# Patient Record
Sex: FEMALE | Race: OTHER | NOT HISPANIC OR LATINO
[De-identification: names, ages, dates, MRNs, and addresses within clinical notes are randomized per-mention and may not be internally consistent; named-entity substitution may affect disease eponyms.]

---

## 2017-06-29 ENCOUNTER — APPOINTMENT (OUTPATIENT)
Dept: OPHTHALMOLOGY | Facility: CLINIC | Age: 72
End: 2017-06-29

## 2017-06-29 DIAGNOSIS — H35.371 PUCKERING OF MACULA, RIGHT EYE: ICD-10-CM

## 2017-06-29 DIAGNOSIS — H44.111: ICD-10-CM

## 2017-06-29 DIAGNOSIS — H30.891: ICD-10-CM

## 2017-06-29 DIAGNOSIS — H40.003 PREGLAUCOMA, UNSPECIFIED, BILATERAL: ICD-10-CM

## 2018-01-22 ENCOUNTER — APPOINTMENT (OUTPATIENT)
Dept: HEART AND VASCULAR | Facility: CLINIC | Age: 73
End: 2018-01-22
Payer: MEDICARE

## 2018-01-22 VITALS
RESPIRATION RATE: 13 BRPM | HEART RATE: 76 BPM | HEIGHT: 59 IN | WEIGHT: 136 LBS | BODY MASS INDEX: 27.42 KG/M2 | TEMPERATURE: 97.7 F | SYSTOLIC BLOOD PRESSURE: 125 MMHG | DIASTOLIC BLOOD PRESSURE: 80 MMHG | OXYGEN SATURATION: 96 %

## 2018-01-22 DIAGNOSIS — J40 BRONCHITIS, NOT SPECIFIED AS ACUTE OR CHRONIC: ICD-10-CM

## 2018-01-22 DIAGNOSIS — E78.5 HYPERLIPIDEMIA, UNSPECIFIED: ICD-10-CM

## 2018-01-22 DIAGNOSIS — Z78.9 OTHER SPECIFIED HEALTH STATUS: ICD-10-CM

## 2018-01-22 DIAGNOSIS — I65.29 OCCLUSION AND STENOSIS OF UNSPECIFIED CAROTID ARTERY: ICD-10-CM

## 2018-01-22 DIAGNOSIS — Z87.891 PERSONAL HISTORY OF NICOTINE DEPENDENCE: ICD-10-CM

## 2018-01-22 DIAGNOSIS — Z87.19 PERSONAL HISTORY OF OTHER DISEASES OF THE DIGESTIVE SYSTEM: ICD-10-CM

## 2018-01-22 DIAGNOSIS — R07.89 OTHER CHEST PAIN: ICD-10-CM

## 2018-01-22 DIAGNOSIS — Z82.49 FAMILY HISTORY OF ISCHEMIC HEART DISEASE AND OTHER DISEASES OF THE CIRCULATORY SYSTEM: ICD-10-CM

## 2018-01-22 DIAGNOSIS — E53.8 DEFICIENCY OF OTHER SPECIFIED B GROUP VITAMINS: ICD-10-CM

## 2018-01-22 PROCEDURE — 99203 OFFICE O/P NEW LOW 30 MIN: CPT

## 2018-01-22 RX ORDER — CYANOCOBALAMIN 1000 UG/ML
1000 INJECTION INTRAMUSCULAR; SUBCUTANEOUS
Qty: 3 | Refills: 0 | Status: ACTIVE | COMMUNITY
Start: 2017-08-02

## 2018-01-22 RX ORDER — ESTRADIOL 0.03 MG/D
0.03 PATCH TRANSDERMAL
Qty: 12 | Refills: 0 | Status: ACTIVE | COMMUNITY
Start: 2017-12-05

## 2018-01-22 RX ORDER — SUCRALFATE 1 G/1
1 TABLET ORAL
Qty: 180 | Refills: 0 | Status: ACTIVE | COMMUNITY
Start: 2017-12-18

## 2018-01-22 RX ORDER — ATORVASTATIN CALCIUM 20 MG/1
20 TABLET, FILM COATED ORAL DAILY
Qty: 15 | Refills: 0 | Status: ACTIVE | COMMUNITY
Start: 2018-01-22

## 2018-01-22 RX ORDER — FLUTICASONE PROPIONATE AND SALMETEROL 50; 100 UG/1; UG/1
100-50 POWDER RESPIRATORY (INHALATION)
Qty: 60 | Refills: 0 | Status: ACTIVE | COMMUNITY
Start: 2017-10-26

## 2018-01-22 RX ORDER — ALBUTEROL SULFATE 90 UG/1
108 (90 BASE) AEROSOL, METERED RESPIRATORY (INHALATION)
Qty: 8 | Refills: 0 | Status: ACTIVE | COMMUNITY
Start: 2017-10-26

## 2018-01-24 ENCOUNTER — APPOINTMENT (OUTPATIENT)
Dept: HEART AND VASCULAR | Facility: CLINIC | Age: 73
End: 2018-01-24
Payer: MEDICARE

## 2018-01-24 DIAGNOSIS — R06.09 OTHER FORMS OF DYSPNEA: ICD-10-CM

## 2018-01-24 DIAGNOSIS — I34.0 NONRHEUMATIC MITRAL (VALVE) INSUFFICIENCY: ICD-10-CM

## 2018-01-24 PROCEDURE — 93306 TTE W/DOPPLER COMPLETE: CPT | Mod: XE

## 2018-01-24 PROCEDURE — A9500: CPT

## 2018-01-24 PROCEDURE — 99213 OFFICE O/P EST LOW 20 MIN: CPT | Mod: 25

## 2018-01-24 PROCEDURE — 93015 CV STRESS TEST SUPVJ I&R: CPT

## 2018-01-24 PROCEDURE — 78452 HT MUSCLE IMAGE SPECT MULT: CPT

## 2018-03-13 ENCOUNTER — RX RENEWAL (OUTPATIENT)
Age: 73
End: 2018-03-13

## 2018-03-13 RX ORDER — ENALAPRIL MALEATE 2.5 MG/1
2.5 TABLET ORAL DAILY
Qty: 30 | Refills: 0 | Status: ACTIVE | COMMUNITY
Start: 2018-01-24 | End: 1900-01-01

## 2018-07-27 PROBLEM — Z78.9 ALCOHOL USE: Status: ACTIVE | Noted: 2018-01-22

## 2018-10-01 ENCOUNTER — RESULT REVIEW (OUTPATIENT)
Age: 73
End: 2018-10-01

## 2019-04-05 ENCOUNTER — OUTPATIENT (OUTPATIENT)
Dept: OUTPATIENT SERVICES | Facility: HOSPITAL | Age: 74
LOS: 1 days | Discharge: HOME | End: 2019-04-05
Payer: MEDICARE

## 2019-04-05 VITALS
DIASTOLIC BLOOD PRESSURE: 68 MMHG | SYSTOLIC BLOOD PRESSURE: 123 MMHG | HEART RATE: 94 BPM | OXYGEN SATURATION: 98 % | RESPIRATION RATE: 21 BRPM

## 2019-04-05 VITALS
HEART RATE: 85 BPM | OXYGEN SATURATION: 98 % | RESPIRATION RATE: 18 BRPM | HEIGHT: 58 IN | DIASTOLIC BLOOD PRESSURE: 83 MMHG | SYSTOLIC BLOOD PRESSURE: 155 MMHG | WEIGHT: 144.4 LBS | TEMPERATURE: 98 F

## 2019-04-05 DIAGNOSIS — H25.093 OTHER AGE-RELATED INCIPIENT CATARACT, BILATERAL: Chronic | ICD-10-CM

## 2019-04-05 DIAGNOSIS — Z90.710 ACQUIRED ABSENCE OF BOTH CERVIX AND UTERUS: Chronic | ICD-10-CM

## 2019-04-05 LAB
APPEARANCE UR: CLEAR — SIGNIFICANT CHANGE UP
BACTERIA # UR AUTO: PRESENT /HPF
BILIRUB UR-MCNC: NEGATIVE — SIGNIFICANT CHANGE UP
COLOR SPEC: YELLOW — SIGNIFICANT CHANGE UP
DIFF PNL FLD: ABNORMAL
EPI CELLS # UR: ABNORMAL /HPF (ref 0–5)
GLUCOSE UR QL: NEGATIVE — SIGNIFICANT CHANGE UP
KETONES UR-MCNC: NEGATIVE — SIGNIFICANT CHANGE UP
LEUKOCYTE ESTERASE UR-ACNC: ABNORMAL
NITRITE UR-MCNC: NEGATIVE — SIGNIFICANT CHANGE UP
PH UR: 6 — SIGNIFICANT CHANGE UP (ref 5–8)
PROT UR-MCNC: 100 MG/DL
RBC CASTS # UR COMP ASSIST: > 10 /HPF
SP GR SPEC: >=1.03 — SIGNIFICANT CHANGE UP (ref 1–1.03)
UROBILINOGEN FLD QL: 0.2 E.U./DL — SIGNIFICANT CHANGE UP
WBC UR QL: ABNORMAL /HPF

## 2019-04-05 PROCEDURE — 81001 URINALYSIS AUTO W/SCOPE: CPT

## 2019-04-05 PROCEDURE — 87086 URINE CULTURE/COLONY COUNT: CPT

## 2019-04-05 PROCEDURE — 29877 ARTHRS KNEE SURG DBRDMT/SHVG: CPT | Mod: LT

## 2019-04-05 RX ORDER — SODIUM CHLORIDE 9 MG/ML
1000 INJECTION, SOLUTION INTRAVENOUS
Qty: 0 | Refills: 0 | Status: DISCONTINUED | OUTPATIENT
Start: 2019-04-05 | End: 2019-04-05

## 2019-04-05 RX ORDER — MORPHINE SULFATE 50 MG/1
4 CAPSULE, EXTENDED RELEASE ORAL
Qty: 0 | Refills: 0 | Status: DISCONTINUED | OUTPATIENT
Start: 2019-04-05 | End: 2019-04-05

## 2019-04-05 RX ORDER — OXYCODONE AND ACETAMINOPHEN 5; 325 MG/1; MG/1
2 TABLET ORAL EVERY 4 HOURS
Qty: 0 | Refills: 0 | Status: DISCONTINUED | OUTPATIENT
Start: 2019-04-05 | End: 2019-04-05

## 2019-04-05 RX ORDER — OXYCODONE AND ACETAMINOPHEN 5; 325 MG/1; MG/1
1 TABLET ORAL EVERY 4 HOURS
Qty: 0 | Refills: 0 | Status: DISCONTINUED | OUTPATIENT
Start: 2019-04-05 | End: 2019-04-05

## 2019-04-06 LAB
CULTURE RESULTS: SIGNIFICANT CHANGE UP
SPECIMEN SOURCE: SIGNIFICANT CHANGE UP

## 2020-12-29 NOTE — ASU PREOP CHECKLIST - HAND OFF
Over the counter rewetting drops to help your eyes feel better:  These are okay to use 1-4 times a day on both eyes.  Remember that the more consistently you use these, the more effective they can be.  Avoid any product that has a redness reliever as they can be addictive and can make feelings of dryness worse.    *Systane Balance   *Systane Complete   *TheraTears Plus  *Refresh Relieva  *Genteal  *Blink Gel Tears    
yes

## 2022-07-19 PROBLEM — H40.003 GLAUCOMA SUSPECT OF BOTH EYES: Status: ACTIVE | Noted: 2017-06-29

## 2023-04-20 ENCOUNTER — OFFICE (OUTPATIENT)
Dept: URBAN - METROPOLITAN AREA CLINIC 92 | Facility: CLINIC | Age: 78
Setting detail: OPHTHALMOLOGY
End: 2023-04-20
Payer: MEDICARE

## 2023-04-20 DIAGNOSIS — H02.831: ICD-10-CM

## 2023-04-20 DIAGNOSIS — H16.223: ICD-10-CM

## 2023-04-20 DIAGNOSIS — H04.223: ICD-10-CM

## 2023-04-20 DIAGNOSIS — H02.832: ICD-10-CM

## 2023-04-20 DIAGNOSIS — H40.013: ICD-10-CM

## 2023-04-20 DIAGNOSIS — H02.835: ICD-10-CM

## 2023-04-20 DIAGNOSIS — H02.834: ICD-10-CM

## 2023-04-20 DIAGNOSIS — Z96.1: ICD-10-CM

## 2023-04-20 DIAGNOSIS — H26.493: ICD-10-CM

## 2023-04-20 PROCEDURE — 92014 COMPRE OPH EXAM EST PT 1/>: CPT | Performed by: SPECIALIST

## 2023-04-20 ASSESSMENT — CONFRONTATIONAL VISUAL FIELD TEST (CVF)
OS_FINDINGS: FULL
OD_FINDINGS: FULL

## 2023-04-20 ASSESSMENT — TEAR BREAK UP TIME (TBUT)
OD_TBUT: 3+
OS_TBUT: 3+

## 2023-04-20 ASSESSMENT — SUPERFICIAL PUNCTATE KERATITIS (SPK)
OS_SPK: T
OD_SPK: 1+

## 2023-04-20 ASSESSMENT — TONOMETRY
OS_IOP_MMHG: 14
OD_IOP_MMHG: 14

## 2023-04-21 ASSESSMENT — KERATOMETRY
OS_AXISANGLE_DEGREES: 161
OD_AXISANGLE_DEGREES: 089
METHOD_AUTO_MANUAL: AUTO
OS_K2POWER_DIOPTERS: 43.75
OS_K1POWER_DIOPTERS: 42.50
OD_K1POWER_DIOPTERS: 43.25
OD_K2POWER_DIOPTERS: 44.00

## 2023-04-21 ASSESSMENT — AXIALLENGTH_DERIVED
OD_AL: 23.306
OS_AL: 23.7305

## 2023-04-21 ASSESSMENT — VISUAL ACUITY
OD_BCVA: 20/25-1
OS_BCVA: 20/30-1

## 2023-04-21 ASSESSMENT — REFRACTION_AUTOREFRACTION
OS_AXIS: 122
OD_SPHERE: +0.50
OD_AXIS: 090
OD_CYLINDER: +0.25
OS_CYLINDER: +0.50
OS_SPHERE: -0.25

## 2023-04-21 ASSESSMENT — SPHEQUIV_DERIVED
OD_SPHEQUIV: 0.625
OS_SPHEQUIV: 0

## 2023-05-17 ENCOUNTER — OFFICE (OUTPATIENT)
Dept: URBAN - METROPOLITAN AREA CLINIC 92 | Facility: CLINIC | Age: 78
Setting detail: OPHTHALMOLOGY
End: 2023-05-17
Payer: MEDICARE

## 2023-05-17 DIAGNOSIS — H04.553: ICD-10-CM

## 2023-05-17 PROCEDURE — 68840 EXPLORE/IRRIGATE TEAR DUCTS: CPT | Performed by: OPHTHALMOLOGY

## 2023-05-17 PROCEDURE — 92012 INTRM OPH EXAM EST PATIENT: CPT | Performed by: OPHTHALMOLOGY

## 2023-05-17 ASSESSMENT — REFRACTION_AUTOREFRACTION
OS_AXIS: 122
OD_AXIS: 090
OS_CYLINDER: +0.50
OD_CYLINDER: +0.25
OS_SPHERE: -0.25
OD_SPHERE: +0.50

## 2023-05-17 ASSESSMENT — AXIALLENGTH_DERIVED
OS_AL: 23.7305
OD_AL: 23.306

## 2023-05-17 ASSESSMENT — KERATOMETRY
OD_K1POWER_DIOPTERS: 43.25
OD_K2POWER_DIOPTERS: 44.00
OS_K1POWER_DIOPTERS: 42.50
OS_K2POWER_DIOPTERS: 43.75
METHOD_AUTO_MANUAL: AUTO
OD_AXISANGLE_DEGREES: 089
OS_AXISANGLE_DEGREES: 161

## 2023-05-17 ASSESSMENT — SPHEQUIV_DERIVED
OD_SPHEQUIV: 0.625
OS_SPHEQUIV: 0

## 2023-05-17 ASSESSMENT — VISUAL ACUITY
OD_BCVA: 20/30
OS_BCVA: 20/40-2

## 2023-05-19 ENCOUNTER — OFFICE (OUTPATIENT)
Dept: URBAN - METROPOLITAN AREA CLINIC 28 | Facility: CLINIC | Age: 78
Setting detail: OPHTHALMOLOGY
End: 2023-05-19
Payer: MEDICARE

## 2023-05-19 ENCOUNTER — RX ONLY (RX ONLY)
Age: 78
End: 2023-05-19

## 2023-05-19 DIAGNOSIS — H26.491: ICD-10-CM

## 2023-05-19 PROBLEM — H04.553 NASOLACRIMAL DUCT OBSTRUCTION ACQUIRED; RIGHT EYE, LEFT EYE, BOTH EYES: Status: ACTIVE | Noted: 2023-05-17

## 2023-05-19 PROBLEM — H04.551 NASOLACRIMAL DUCT OBSTRUCTION ACQUIRED; RIGHT EYE, LEFT EYE, BOTH EYES: Status: ACTIVE | Noted: 2023-05-17

## 2023-05-19 PROBLEM — H04.552 NASOLACRIMAL DUCT OBSTRUCTION ACQUIRED; RIGHT EYE, LEFT EYE, BOTH EYES: Status: ACTIVE | Noted: 2023-05-17

## 2023-05-19 PROCEDURE — 66821 AFTER CATARACT LASER SURGERY: CPT | Performed by: SPECIALIST

## 2023-05-19 ASSESSMENT — KERATOMETRY
OD_K2POWER_DIOPTERS: 44.00
OD_AXISANGLE_DEGREES: 125
METHOD_AUTO_MANUAL: AUTO
OS_K1POWER_DIOPTERS: 43.75
OS_K2POWER_DIOPTERS: 44.00
OS_AXISANGLE_DEGREES: 099
OD_K1POWER_DIOPTERS: 43.00

## 2023-05-19 ASSESSMENT — SPHEQUIV_DERIVED
OS_SPHEQUIV: 0.25
OD_SPHEQUIV: 1.25

## 2023-05-19 ASSESSMENT — VISUAL ACUITY
OD_BCVA: 20/25-2
OS_BCVA: 20/60+2

## 2023-05-19 ASSESSMENT — REFRACTION_AUTOREFRACTION
OS_AXIS: 075
OD_AXIS: 050
OD_CYLINDER: -1.00
OS_SPHERE: +0.75
OD_SPHERE: +1.75
OS_CYLINDER: -1.00

## 2023-05-19 ASSESSMENT — AXIALLENGTH_DERIVED
OD_AL: 23.1147
OS_AL: 23.3591

## 2023-06-21 ENCOUNTER — OFFICE (OUTPATIENT)
Dept: URBAN - METROPOLITAN AREA CLINIC 92 | Facility: CLINIC | Age: 78
Setting detail: OPHTHALMOLOGY
End: 2023-06-21
Payer: MEDICARE

## 2023-06-21 DIAGNOSIS — H02.831: ICD-10-CM

## 2023-06-21 DIAGNOSIS — H02.832: ICD-10-CM

## 2023-06-21 DIAGNOSIS — H16.223: ICD-10-CM

## 2023-06-21 DIAGNOSIS — H04.553: ICD-10-CM

## 2023-06-21 DIAGNOSIS — H02.834: ICD-10-CM

## 2023-06-21 PROCEDURE — 68810 PROBE NASOLACRIMAL DUCT: CPT | Performed by: OPHTHALMOLOGY

## 2023-06-21 PROCEDURE — 92012 INTRM OPH EXAM EST PATIENT: CPT | Performed by: OPHTHALMOLOGY

## 2023-06-21 ASSESSMENT — KERATOMETRY
OS_AXISANGLE_DEGREES: 099
METHOD_AUTO_MANUAL: AUTO
OD_K1POWER_DIOPTERS: 43.00
OS_K1POWER_DIOPTERS: 43.75
OD_AXISANGLE_DEGREES: 125
OD_K2POWER_DIOPTERS: 44.00
OS_K2POWER_DIOPTERS: 44.00

## 2023-06-21 ASSESSMENT — SUPERFICIAL PUNCTATE KERATITIS (SPK)
OS_SPK: T
OD_SPK: 1+

## 2023-06-21 ASSESSMENT — REFRACTION_AUTOREFRACTION
OS_CYLINDER: -1.00
OD_SPHERE: +1.75
OS_SPHERE: +0.75
OS_AXIS: 075
OD_CYLINDER: -1.00
OD_AXIS: 050

## 2023-06-21 ASSESSMENT — TEAR BREAK UP TIME (TBUT)
OD_TBUT: 3+
OS_TBUT: 3+

## 2023-06-21 ASSESSMENT — CONFRONTATIONAL VISUAL FIELD TEST (CVF)
OD_FINDINGS: FULL
OS_FINDINGS: FULL

## 2023-06-21 ASSESSMENT — AXIALLENGTH_DERIVED
OD_AL: 23.1147
OS_AL: 23.3591

## 2023-06-21 ASSESSMENT — VISUAL ACUITY
OS_BCVA: 20/40-2
OD_BCVA: 20/30+2

## 2023-06-21 ASSESSMENT — SPHEQUIV_DERIVED
OD_SPHEQUIV: 1.25
OS_SPHEQUIV: 0.25

## 2023-06-30 ENCOUNTER — OFFICE (OUTPATIENT)
Dept: URBAN - METROPOLITAN AREA CLINIC 28 | Facility: CLINIC | Age: 78
Setting detail: OPHTHALMOLOGY
End: 2023-06-30
Payer: MEDICARE

## 2023-06-30 DIAGNOSIS — H16.223: ICD-10-CM

## 2023-06-30 DIAGNOSIS — H04.552: ICD-10-CM

## 2023-06-30 DIAGNOSIS — H04.551: ICD-10-CM

## 2023-06-30 DIAGNOSIS — H04.553: ICD-10-CM

## 2023-06-30 PROBLEM — H02.83 DERMATOCHALASIS; RIGHT UPPER LID, RIGHT LOWER LID, LEFT UPPER LID, LEFT LOWER LID: Status: ACTIVE | Noted: 2023-06-21

## 2023-06-30 PROCEDURE — 99024 POSTOP FOLLOW-UP VISIT: CPT | Performed by: SPECIALIST

## 2023-06-30 ASSESSMENT — SUPERFICIAL PUNCTATE KERATITIS (SPK)
OD_SPK: 1+
OS_SPK: T

## 2023-06-30 ASSESSMENT — TEAR BREAK UP TIME (TBUT)
OS_TBUT: 3+
OD_TBUT: 3+

## 2023-06-30 ASSESSMENT — REFRACTION_AUTOREFRACTION
OS_AXIS: 064
OS_SPHERE: +1.00
OS_CYLINDER: -1.00
OD_SPHERE: +1.00
OD_CYLINDER: -0.75
OD_AXIS: 047

## 2023-06-30 ASSESSMENT — VISUAL ACUITY
OD_BCVA: 20/25-2
OS_BCVA: 20/40-

## 2023-06-30 ASSESSMENT — CONFRONTATIONAL VISUAL FIELD TEST (CVF)
OD_FINDINGS: FULL
OS_FINDINGS: FULL

## 2023-06-30 ASSESSMENT — SPHEQUIV_DERIVED
OS_SPHEQUIV: 0.5
OD_SPHEQUIV: 0.625

## 2023-11-03 ENCOUNTER — OFFICE (OUTPATIENT)
Dept: URBAN - METROPOLITAN AREA CLINIC 28 | Facility: CLINIC | Age: 78
Setting detail: OPHTHALMOLOGY
End: 2023-11-03
Payer: MEDICARE

## 2023-11-03 DIAGNOSIS — H02.832: ICD-10-CM

## 2023-11-03 DIAGNOSIS — H40.013: ICD-10-CM

## 2023-11-03 DIAGNOSIS — Z96.1: ICD-10-CM

## 2023-11-03 DIAGNOSIS — H26.493: ICD-10-CM

## 2023-11-03 DIAGNOSIS — H02.835: ICD-10-CM

## 2023-11-03 DIAGNOSIS — H02.831: ICD-10-CM

## 2023-11-03 DIAGNOSIS — H16.223: ICD-10-CM

## 2023-11-03 DIAGNOSIS — H02.834: ICD-10-CM

## 2023-11-03 PROCEDURE — 92014 COMPRE OPH EXAM EST PT 1/>: CPT | Performed by: SPECIALIST

## 2023-11-03 ASSESSMENT — CONFRONTATIONAL VISUAL FIELD TEST (CVF)
OS_FINDINGS: FULL
OD_FINDINGS: FULL

## 2023-11-03 ASSESSMENT — REFRACTION_AUTOREFRACTION
OD_SPHERE: +1.25
OS_AXIS: 069
OS_CYLINDER: -1.00
OD_CYLINDER: -1.00
OS_SPHERE: +0.50
OD_AXIS: 064

## 2023-11-03 ASSESSMENT — SUPERFICIAL PUNCTATE KERATITIS (SPK)
OS_SPK: T
OD_SPK: 1+

## 2023-11-03 ASSESSMENT — SPHEQUIV_DERIVED
OD_SPHEQUIV: 0.75
OS_SPHEQUIV: 0

## 2023-11-03 ASSESSMENT — TEAR BREAK UP TIME (TBUT)
OS_TBUT: 3+
OD_TBUT: 3+

## 2024-03-19 PROBLEM — K44.9 DIAPHRAGMATIC HERNIA WITHOUT OBSTRUCTION OR GANGRENE: Chronic | Status: ACTIVE | Noted: 2019-04-04

## 2024-03-19 PROBLEM — K29.90 GASTRODUODENITIS, UNSPECIFIED, WITHOUT BLEEDING: Chronic | Status: ACTIVE | Noted: 2019-04-04

## 2024-03-19 PROBLEM — K21.9 GASTRO-ESOPHAGEAL REFLUX DISEASE WITHOUT ESOPHAGITIS: Chronic | Status: ACTIVE | Noted: 2019-04-04

## 2024-03-19 PROBLEM — J40 BRONCHITIS, NOT SPECIFIED AS ACUTE OR CHRONIC: Chronic | Status: ACTIVE | Noted: 2019-04-05

## 2024-03-19 PROBLEM — K63.5 POLYP OF COLON: Chronic | Status: ACTIVE | Noted: 2019-04-04

## 2024-03-19 PROBLEM — K57.90 DIVERTICULOSIS OF INTESTINE, PART UNSPECIFIED, WITHOUT PERFORATION OR ABSCESS WITHOUT BLEEDING: Chronic | Status: ACTIVE | Noted: 2019-04-04

## 2024-04-04 ENCOUNTER — NON-APPOINTMENT (OUTPATIENT)
Age: 79
End: 2024-04-04

## 2024-04-04 ENCOUNTER — APPOINTMENT (OUTPATIENT)
Dept: COLORECTAL SURGERY | Facility: CLINIC | Age: 79
End: 2024-04-04
Payer: MEDICARE

## 2024-04-04 VITALS
DIASTOLIC BLOOD PRESSURE: 79 MMHG | HEART RATE: 98 BPM | WEIGHT: 150 LBS | HEIGHT: 59 IN | TEMPERATURE: 96.8 F | BODY MASS INDEX: 30.24 KG/M2 | SYSTOLIC BLOOD PRESSURE: 138 MMHG

## 2024-04-04 DIAGNOSIS — K64.9 UNSPECIFIED HEMORRHOIDS: ICD-10-CM

## 2024-04-04 PROCEDURE — 99203 OFFICE O/P NEW LOW 30 MIN: CPT | Mod: 25

## 2024-04-04 PROCEDURE — 46600 DIAGNOSTIC ANOSCOPY SPX: CPT

## 2024-04-04 RX ORDER — HYDROCORTISONE 25 MG/G
2.5 CREAM TOPICAL
Qty: 1 | Refills: 3 | Status: ACTIVE | COMMUNITY
Start: 2024-04-04 | End: 1900-01-01

## 2024-04-04 RX ORDER — DULOXETINE HYDROCHLORIDE 60 MG/1
60 CAPSULE, DELAYED RELEASE PELLETS ORAL
Refills: 0 | Status: ACTIVE | COMMUNITY

## 2024-04-04 RX ORDER — ALPRAZOLAM 0.5 MG/1
0.5 TABLET ORAL
Refills: 0 | Status: ACTIVE | COMMUNITY

## 2024-04-04 NOTE — ASSESSMENT
[FreeTextEntry1] : Exam findings and diagnosis were discussed at length with patient.  Management options discussed. Recommend supportive care.  Witch hazel and hydrocortisone as needed for symptoms were discussed. All questions were answered, patient expressed understanding, and is agreeable to this plan.

## 2024-04-04 NOTE — PHYSICAL EXAM
[FreeTextEntry1] : Medical assistant present for duration of physical examination   General no acute distress, alert and oriented Psych calm, pleasant demeanor, responding appropriately to questions Nonlabored breathing Ambulating without assistance Skin normal color and pigment, no visible lesions or rashes    Anorectal Exam: Inspection no erythema, induration or fluctuance, no skin excoriation, no fissure, small residual right anterior external hemorrhoid without acute inflammation or thrombosis TORRIE nontender, no masses palpated, no blood on gloved finger   Procedure: Anoscopy   Pre procedure Diagnosis: hemorrhoid Post procedure Diagnosis: hemorrhoid Anesthesia: none Estimated blood loss: none Specimen: none Complications: none   Consent obtained. Anoscopy was performed by passing a lighted anoscope with lubricant jelly into the anal canal and the entire anal mucosal surface was inspected. Findings included no fissure, mild internal hemorrhoids wihtout acute inflammation, no visible masses or lesions in anal canal   Patient tolerated examination and procedure well.

## 2024-04-04 NOTE — HISTORY OF PRESENT ILLNESS
[FreeTextEntry1] : 80 yo F presents for initial evaluation of hemorrhoids, referred by OBGYN Dr. Milagros Garcia  Denies PMH or PSH, per chart review, seen by Cardiologist 2018, h/o HLD, coronary artery plaque, MV insufficiency, R choroiditis and panuveitis syndrome, h/o gastric ulcer. Pt denies glaucoma, reports cataracts h/o hysterectomy () 2/2 dysplasia of uterus (denies malignancy, cervix in place), cataract surgery, ingrown toe nail treatment  Former cigarette use x 40 years, quit ~  Medication: duloxetine 60 mg, Xanax .05mg, atorvastatin  All: None Mother  from colon CA age 61  Last colonoscopy ~ with Dr. Daryl Tirado which was normal per pt. Personal h/o polyps. 10/1/2018 EGD- pathology in chart showed chronic inflammation of esophagus, reactive gastropathy, neg H. Pylori, last EGD in  per pt  Reports h/o hemorrhoids after delivery of child 58 years ago. Managed w/ OTC suppository in past years, but didn't feel like it helped.  Denies worsening of symptoms over the years, but finally interested in treatment. GI referred her to [facility] whom she saw ~ 2 years ago, advised dietary changes, no further treatment given.  Hemorrhoids are external and associated w/ sudden discomfort and itching with sitting. Admits to seeing scant BRB on panty liner this morning, otherwise denies h/o bleeding Denies fecal smearing or difficulty cleaning after defecation Denies using topical creams, witch hazel or sitz baths Moves bowels twice daily, cleans self w/ TP and wet wipes and admits to being meticulous .  Pt denies issues with stooling. Denies constipation, straining or diarrhea Does not take fiber supplement or stool softener Reports daily intake dietary fiber. Drinks several glasses water daily Denies ASA use

## 2024-05-03 ENCOUNTER — OFFICE (OUTPATIENT)
Dept: URBAN - METROPOLITAN AREA CLINIC 28 | Facility: CLINIC | Age: 79
Setting detail: OPHTHALMOLOGY
End: 2024-05-03
Payer: MEDICARE

## 2024-05-03 DIAGNOSIS — H26.493: ICD-10-CM

## 2024-05-03 DIAGNOSIS — H02.832: ICD-10-CM

## 2024-05-03 DIAGNOSIS — H02.834: ICD-10-CM

## 2024-05-03 DIAGNOSIS — H16.223: ICD-10-CM

## 2024-05-03 DIAGNOSIS — Z96.1: ICD-10-CM

## 2024-05-03 DIAGNOSIS — H02.835: ICD-10-CM

## 2024-05-03 DIAGNOSIS — H02.831: ICD-10-CM

## 2024-05-03 DIAGNOSIS — H40.013: ICD-10-CM

## 2024-05-03 PROCEDURE — 92133 CPTRZD OPH DX IMG PST SGM ON: CPT | Performed by: SPECIALIST

## 2024-05-03 PROCEDURE — 92012 INTRM OPH EXAM EST PATIENT: CPT | Performed by: SPECIALIST

## 2024-05-03 PROCEDURE — 92083 EXTENDED VISUAL FIELD XM: CPT | Performed by: SPECIALIST

## 2024-05-03 ASSESSMENT — CONFRONTATIONAL VISUAL FIELD TEST (CVF)
OS_FINDINGS: FULL
OD_FINDINGS: FULL

## 2024-11-08 ENCOUNTER — OFFICE (OUTPATIENT)
Dept: URBAN - METROPOLITAN AREA CLINIC 28 | Facility: CLINIC | Age: 79
Setting detail: OPHTHALMOLOGY
End: 2024-11-08
Payer: MEDICARE

## 2024-11-08 DIAGNOSIS — H02.832: ICD-10-CM

## 2024-11-08 DIAGNOSIS — H02.831: ICD-10-CM

## 2024-11-08 DIAGNOSIS — H40.013: ICD-10-CM

## 2024-11-08 DIAGNOSIS — Z96.1: ICD-10-CM

## 2024-11-08 DIAGNOSIS — H02.835: ICD-10-CM

## 2024-11-08 DIAGNOSIS — H16.223: ICD-10-CM

## 2024-11-08 DIAGNOSIS — H02.834: ICD-10-CM

## 2024-11-08 DIAGNOSIS — H26.493: ICD-10-CM

## 2024-11-08 PROCEDURE — 92133 CPTRZD OPH DX IMG PST SGM ON: CPT | Performed by: SPECIALIST

## 2024-11-08 PROCEDURE — 92083 EXTENDED VISUAL FIELD XM: CPT | Performed by: SPECIALIST

## 2024-11-08 PROCEDURE — 92012 INTRM OPH EXAM EST PATIENT: CPT | Performed by: SPECIALIST

## 2024-11-08 ASSESSMENT — TEAR BREAK UP TIME (TBUT)
OS_TBUT: 7 SECS
OD_TBUT: 7 SECS

## 2024-11-08 ASSESSMENT — SUPERFICIAL PUNCTATE KERATITIS (SPK)
OS_SPK: 1+ 2+
OD_SPK: 1+ 2+

## 2024-11-08 ASSESSMENT — TONOMETRY
OS_IOP_MMHG: 14
OD_IOP_MMHG: 14

## 2024-11-08 ASSESSMENT — CONFRONTATIONAL VISUAL FIELD TEST (CVF)
OD_FINDINGS: FULL
OS_FINDINGS: FULL

## 2024-11-10 ASSESSMENT — REFRACTION_AUTOREFRACTION
OD_CYLINDER: -1.50
OS_SPHERE: +0.75
OS_AXIS: 048
OD_AXIS: 081
OD_SPHERE: +2.50
OS_CYLINDER: -1.00

## 2024-11-10 ASSESSMENT — KERATOMETRY
OD_K2POWER_DIOPTERS: 43.50
OD_K1POWER_DIOPTERS: 42.75
OS_K2POWER_DIOPTERS: 43.50
OS_K1POWER_DIOPTERS: 43.25
OD_AXISANGLE_DEGREES: 139
METHOD_AUTO_MANUAL: AUTO
OS_AXISANGLE_DEGREES: 015

## 2024-11-10 ASSESSMENT — VISUAL ACUITY
OD_BCVA: 20/30+2
OS_BCVA: 20/30-2

## 2025-05-09 ENCOUNTER — RX ONLY (RX ONLY)
Age: 80
End: 2025-05-09

## 2025-05-09 ENCOUNTER — OFFICE (OUTPATIENT)
Dept: URBAN - METROPOLITAN AREA CLINIC 28 | Facility: CLINIC | Age: 80
Setting detail: OPHTHALMOLOGY
End: 2025-05-09
Payer: MEDICARE

## 2025-05-09 DIAGNOSIS — H02.89: ICD-10-CM

## 2025-05-09 DIAGNOSIS — H02.834: ICD-10-CM

## 2025-05-09 DIAGNOSIS — H40.013: ICD-10-CM

## 2025-05-09 DIAGNOSIS — H26.493: ICD-10-CM

## 2025-05-09 DIAGNOSIS — H02.832: ICD-10-CM

## 2025-05-09 DIAGNOSIS — H02.831: ICD-10-CM

## 2025-05-09 DIAGNOSIS — H16.223: ICD-10-CM

## 2025-05-09 DIAGNOSIS — Z96.1: ICD-10-CM
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
## 2025-05-09 DIAGNOSIS — H02.835: ICD-10-CM

## 2025-05-09 PROCEDURE — 92083 EXTENDED VISUAL FIELD XM: CPT | Performed by: SPECIALIST

## 2025-05-09 PROCEDURE — BRUDER EYE BRUDER EYE PADS: Performed by: SPECIALIST

## 2025-05-09 PROCEDURE — 99213 OFFICE O/P EST LOW 20 MIN: CPT | Performed by: SPECIALIST

## 2025-05-09 PROCEDURE — 92133 CPTRZD OPH DX IMG PST SGM ON: CPT | Performed by: SPECIALIST

## 2025-05-09 ASSESSMENT — LID EXAM ASSESSMENTS
OS_DERMATOCHALASIS: 4+
OS_MRD1: 1 MM
OD_MRD1: 1 MM
OS_MEIBOMITIS: LLL LUL 2+ 3+
OD_MEIBOMITIS: RLL RUL 2+ 3+
OD_DERMATOCHALASIS: 4+

## 2025-05-09 ASSESSMENT — REFRACTION_AUTOREFRACTION
OD_SPHERE: +1.75
OS_CYLINDER: -0.50
OD_CYLINDER: -1.00
OS_AXIS: 076
OS_SPHERE: +0.50
OD_AXIS: 061

## 2025-05-09 ASSESSMENT — TONOMETRY
OD_IOP_MMHG: 16
OS_IOP_MMHG: 16

## 2025-05-09 ASSESSMENT — VISUAL ACUITY
OD_BCVA: 20/25
OS_BCVA: 20/30-2

## 2025-05-09 ASSESSMENT — KERATOMETRY
OS_AXISANGLE_DEGREES: 114
OD_AXISANGLE_DEGREES: 130
METHOD_AUTO_MANUAL: AUTO
OS_K2POWER_DIOPTERS: 44.00
OD_K2POWER_DIOPTERS: 44.00
OD_K1POWER_DIOPTERS: 43.00
OS_K1POWER_DIOPTERS: 43.75

## 2025-05-09 ASSESSMENT — TEAR BREAK UP TIME (TBUT)
OD_TBUT: 7 SECS
OS_TBUT: 7 SECS

## 2025-05-09 ASSESSMENT — SUPERFICIAL PUNCTATE KERATITIS (SPK)
OS_SPK: 1+ 2+
OD_SPK: 1+ 2+

## 2025-05-09 ASSESSMENT — CONFRONTATIONAL VISUAL FIELD TEST (CVF)
OS_FINDINGS: FULL
OD_FINDINGS: FULL

## 2025-05-09 ASSESSMENT — LID POSITION - DERMATOCHALASIS
OS_DERMATOCHALASIS: LUL 4+
OD_DERMATOCHALASIS: RUL 4+